# Patient Record
Sex: FEMALE | Race: WHITE | NOT HISPANIC OR LATINO | URBAN - METROPOLITAN AREA
[De-identification: names, ages, dates, MRNs, and addresses within clinical notes are randomized per-mention and may not be internally consistent; named-entity substitution may affect disease eponyms.]

---

## 2024-02-24 ENCOUNTER — EMERGENCY (EMERGENCY)
Facility: HOSPITAL | Age: 74
LOS: 1 days | Discharge: HOME | End: 2024-02-24
Admitting: EMERGENCY MEDICINE
Payer: MEDICARE

## 2024-02-24 VITALS
HEIGHT: 66 IN | DIASTOLIC BLOOD PRESSURE: 92 MMHG | RESPIRATION RATE: 18 BRPM | OXYGEN SATURATION: 94 % | SYSTOLIC BLOOD PRESSURE: 170 MMHG | WEIGHT: 190.04 LBS | TEMPERATURE: 99 F | HEART RATE: 85 BPM

## 2024-02-24 DIAGNOSIS — I10 ESSENTIAL (PRIMARY) HYPERTENSION: ICD-10-CM
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
## 2024-02-24 DIAGNOSIS — S52.502A UNSPECIFIED FRACTURE OF THE LOWER END OF LEFT RADIUS, INITIAL ENCOUNTER FOR CLOSED FRACTURE: ICD-10-CM

## 2024-02-24 DIAGNOSIS — S52.612A DISPLACED FRACTURE OF LEFT ULNA STYLOID PROCESS, INITIAL ENCOUNTER FOR CLOSED FRACTURE: ICD-10-CM
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
## 2024-02-24 DIAGNOSIS — Z88.0 ALLERGY STATUS TO PENICILLIN: ICD-10-CM

## 2024-02-24 PROCEDURE — 73110 X-RAY EXAM OF WRIST: CPT

## 2024-02-24 PROCEDURE — 96374 THER/PROPH/DIAG INJ IV PUSH: CPT | Mod: XU

## 2024-02-24 PROCEDURE — 99284 EMERGENCY DEPT VISIT MOD MDM: CPT

## 2024-02-24 PROCEDURE — 73110 X-RAY EXAM OF WRIST: CPT | Mod: 26,LT

## 2024-02-24 PROCEDURE — 99285 EMERGENCY DEPT VISIT HI MDM: CPT | Mod: 25

## 2024-02-24 PROCEDURE — 25605 CLTX DST RDL FX/EPHYS SEP W/: CPT | Mod: LT

## 2024-02-24 RX ORDER — HYDROMORPHONE HYDROCHLORIDE 2 MG/ML
0.5 INJECTION INTRAMUSCULAR; INTRAVENOUS; SUBCUTANEOUS ONCE
Refills: 0 | Status: DISCONTINUED | OUTPATIENT
Start: 2024-02-24 | End: 2024-02-24

## 2024-02-24 RX ORDER — OXYCODONE HYDROCHLORIDE 5 MG/1
1 TABLET ORAL
Qty: 12 | Refills: 0
Start: 2024-02-24 | End: 2024-02-25

## 2024-02-24 RX ADMIN — HYDROMORPHONE HYDROCHLORIDE 0.5 MILLIGRAM(S): 2 INJECTION INTRAMUSCULAR; INTRAVENOUS; SUBCUTANEOUS at 13:53

## 2024-02-24 RX ADMIN — HYDROMORPHONE HYDROCHLORIDE 0.5 MILLIGRAM(S): 2 INJECTION INTRAMUSCULAR; INTRAVENOUS; SUBCUTANEOUS at 14:11

## 2024-02-24 NOTE — ED PROVIDER NOTE - PATIENT PORTAL LINK FT
Medication refill for     hydroCHLOROthiazide (HYDRODIURIL) 25 MG tablet    [facility] - [address] - P [phone] - F [phone]  
You can access the FollowMyHealth Patient Portal offered by [facility] by registering at the following website: [email or web address]. By joining [employer]’s FollowMyHealth portal, you will also be able to view your health information using other applications (apps) compatible with our system.

## 2024-02-24 NOTE — ED PROVIDER NOTE - CARE PROVIDER_API CALL
Xavier Caldwell  Orthopaedic Surgery  [address]  [place] [address]  Phone: [phone]  Fax: [phone]  Follow Up Time:

## 2024-02-24 NOTE — ED PROVIDER NOTE - PHYSICAL EXAMINATION
CONSTITUTIONAL: Well-appearing;  in no apparent distress.   HEAD: Normocephalic; atraumatic.   EYES: PERRL; EOM intact; conjunctiva and sclera erica  NECK: Supple; non-tender;   CARDIOVASCULAR: rrr,  RESPIRATORY: Breathing easily;   MSK: L wrist in splint, fingers moving freely, +swelling, good cap refill  EXT: No cyanosis or edema; N/V intact  SKIN: Normal for age and race; warm; dry; good turgor; no apparent lesions or rash.

## 2024-02-24 NOTE — ED PROVIDER NOTE - OBJECTIVE STATEMENT
73-year-old female with past medical history of hypertension, right-hand-dominant complaining of a left wrist fracture sustained yesterday.  Patient was walking her dog, her dog pulled her and she fell.  No head strike.  Seen in urgent care last night and was told she had a distal radius and ulnar fracture.  Arm was splinted and she was advised to come to the ER for reduction.  Pain controlled with Tylenol.  Denies numbness, tingling.

## 2024-02-24 NOTE — ED ADULT NURSE NOTE - OBJECTIVE STATEMENT
Patient with the fx to  left wrist. Sent by urgent care for further evaluation. Patient c/o of mild pain, and she took Tylenol PTA.

## 2024-02-24 NOTE — ED PROVIDER NOTE - CLINICAL SUMMARY MEDICAL DECISION MAKING FREE TEXT BOX
73-year-old female with past medical history of hypertension, right-hand-dominant complaining of a left wrist fracture sustained yesterday.  Patient was walking her dog, her dog pulled her and she fell.  No head strike.  Seen in urgent care last night and was told she had a distal radius and ulnar fracture.  Arm was splinted and she was advised to come to the ER for reduction.  Pain controlled with Tylenol.  Denies numbness, tingling. L wrist in splint, fingers moving freely, +swelling, good cap refill.   X-rays from urgent care uploaded show impacted, comminuted distal radius fracture and ulnar styloid fracture.  Ortho consulted.

## 2024-02-24 NOTE — ED ADULT NURSE NOTE - NSFALLRISKINTERV_ED_ALL_ED

## 2024-02-24 NOTE — ED ADULT TRIAGE NOTE - ACCOMPANIED BY
1. \"Have you been to the ER, urgent care clinic since your last visit? Hospitalized since your last visit? \" No    2. \"Have you seen or consulted any other health care providers outside of the [address] [place] [address] since your last visit? \" No     3. For patients aged 43-73: Has the patient had a colonoscopy / FIT/ Cologuard? NA - based on age      If the patient is female:    4. For patients aged 43-66: Has the patient had a mammogram within the past 2 years? NA - based on age or sex      11. For patients aged 21-65: Has the patient had a pap smear?  Yes - no Care Gap present tablet 3    omeprazole (PRILOSEC) 40 MG delayed release capsule Take 1 capsule by mouth 2 times daily 60 capsule 3    PARoxetine (PAXIL) 20 MG tablet Take 1 tablet by mouth daily 90 tablet 3    Levonorgest-Eth Estrad 91-Day 0.15-0.03 &0.01 MG TABS Take by mouth      DAYSEE 0.15-0.03 &0.01 MG TABS       albuterol sulfate HFA (PROVENTIL;VENTOLIN;PROAIR) 108 (90 Base) MCG/ACT inhaler INHALE 2 PUFFS BY MOUTH EVERY 4 HOURS AS NEEDED FOR COUGH AND CONGESTION      ondansetron (ZOFRAN-ODT) 4 MG disintegrating tablet Place 1 tablet under the tongue every 8 hours as needed for Nausea or Vomiting 10 tablet 1     No current facility-administered medications on file prior to visit. family history includes Diabetes in her maternal grandfather and maternal grandmother; Emphysema in her maternal grandmother; Hypertension in her maternal grandfather and maternal grandmother; No Known Problems in her father; Osteoarthritis in her paternal grandmother; Other in her mother and paternal grandmother.     Social History     Socioeconomic History    Marital status: Single     Spouse name: Not on file    Number of children: Not on file    Years of education: Not on file    Highest education level: Not on file   Occupational History    Not on file   Tobacco Use    Smoking status: Never     Passive exposure: Never    Smokeless tobacco: Never   Vaping Use    Vaping Use: Never used   Substance and Sexual Activity    Alcohol use: Yes    Drug use: No    Sexual activity: Not on file   Other Topics Concern    Not on file   Social History Narrative    Not on file     Social Determinants of Health     Financial Resource Strain: Low Risk  (10/23/2023)    Overall Financial Resource Strain (CARDIA)     Difficulty of Paying Living Expenses: Not hard at all   Food Insecurity: No Food Insecurity (10/23/2023)    Hunger Vital Sign     Worried About Running Out of Food in the Last Year: Never true     Ran Out of Food in the Last Year: Never true Immediate family member

## 2024-02-24 NOTE — ED ADULT TRIAGE NOTE - CHIEF COMPLAINT QUOTE
pt sent to ED by  s/p mechanical fall. pt stated " she fracture her left wrist in 2 different places, had splint by "

## 2024-02-24 NOTE — ED PROVIDER NOTE - NSFOLLOWUPINSTRUCTIONS_ED_ALL_ED_FT
Follow up with the Dr. Sebastian within 1 week  Take tylenol as needed for pain, for severe pain can take oxycodone     Wrist Fracture Treated With Immobilization    A wrist fracture is a break or crack in one of the bones of the wrist. The wrist is made up of eight small bones at the palm of the hand (carpal bones) and two long bones that make up the forearm (radius and ulna).    If the joint is stable and the bones are still in their normal position (nondisplaced), the injury may be treated with immobilization. This involves the use of a cast, splint, or sling to hold the wrist in place. Immobilization ensures that the bones continue to stay in the correct position while the wrist is healing.    What are the causes?  This condition may be caused by:  A direct force to the wrist.  Trauma, such as a car crash or falling on an outstretched hand.  What increases the risk?  The following factors may make you more likely to develop this condition:  Doing contact sports or high-risk sports such as skiing, biking, and ice skating.  Taking steroid medicines.  Smoking.  Being female.  Being .  Drinking more than three alcoholic beverages a day.  Having a condition that weakens the bones (osteoporosis).  Being older.  Having a history of previous fractures.  What are the signs or symptoms?  Symptoms of this condition include:  Pain.  Swelling.  Bruising.  Not being able to move the wrist normally.  The wrist may also hang in an odd position or appear deformed.    How is this diagnosed?  This condition may be diagnosed based on a physical exam and X-rays. You may also have a CT scan or MRI.    How is this treated?  Treatment for this condition involves wearing a cast, splint, or sling until the injured area is stable enough for you to begin range-of-motion exercises. You may also be prescribed pain medicine.    Follow these instructions at home:  If you have a cast:    Do not stick anything inside the cast to scratch your skin. Doing that increases your risk of infection.  Check the skin around the cast every day. Tell your health care provider about any concerns.  You may put lotion on dry skin around the edges of the cast. Do not put lotion on the skin underneath the cast.  Keep the cast clean and dry.  If you have a splint or sling:    Wear the splint or sling as told by your health care provider. Remove it only as told by your health care provider.  Loosen the splint or sling if your fingers tingle, become numb, or turn cold and blue.  Keep the splint or sling clean and dry.  Bathing    Do not take baths, swim, or use a hot tub until your health care provider approves. Ask your health care provider if you may take showers. You may only be allowed to take sponge baths.  If your cast, splint, or sling is not waterproof:  Do not let it get wet.  Cover it with a watertight covering when you take a bath or a shower.  If you have a sling, remove it for bathing only if your health care provider tells you it is safe to do that.  Managing pain, stiffness, and swelling      If directed, put ice on the injured area. To do this:  If you have a removable splint or sling, remove it as told by your health care provider.  Put ice in a plastic bag.  Place a towel between your skin and the bag or between your cast and the bag.  Leave the ice on for 20 minutes, 2–3 times a day.  Remove the ice if your skin turns bright red. This is very important. If you cannot feel pain, heat, or cold, you have a greater risk of damage to the area.  Move your fingers often to reduce stiffness and swelling.  Raise (elevate) the injured area above the level of your heart while you are sitting or lying down.  Activity    Return to your normal activities as told by your health care provider. Ask your health care provider what activities are safe for you.  Do not lift with or put weight on your injured wrist until your health care provider approves.  Ask your health care provider when it is safe to drive if you have a cast, splint, or sling on your wrist.  Do range-of-motion exercises only as told by your health care provider or physical therapist.  Medicines    Take over-the-counter and prescription medicines only as told by your health care provider.  Ask your health care provider if the medicine prescribed to you:  Requires you to avoid driving or using machinery.  Can cause constipation. You may need to take these actions to prevent or treat constipation:  Drink enough fluid to keep your urine pale yellow.  Take over-the-counter or prescription medicines.  Eat foods that are high in fiber, such as beans, whole grains, and fresh fruits and vegetables.  Limit foods that are high in fat and processed sugars, such as fried or sweet foods.  General instructions    Do not put pressure on any part of the cast or splint until it is fully hardened. This may take several hours.  Do not use any products that contain nicotine or tobacco, such as cigarettes, e-cigarettes, and chewing tobacco. These can delay bone healing. If you need help quitting, ask your health care provider.  Keep all follow-up visits. This is important.  Contact a health care provider if:  Your cast, splint, or sling is damaged or loose.  You have any new pain, swelling, or bruising.  Your pain, swelling, and bruising do not improve.  You have a fever.  You have chills.  Get help right away if:  Your skin or fingers on your injured arm turn blue or gray.  Your arm feels cold or numb.  You have severe pain in your injured wrist.  Summary  A wrist fracture is a break or crack in one of the bones of the wrist.  If the joint is stable and the bones are still in their normal position, the injury may be treated by wearing a cast, splint, or sling.  If you have a cast, check the skin around the cast every day. Tell your health care provider about any concerns.  If you have a splint or sling, wear it as told by your health care provider. Remove it only as told by your health care provider.  This information is not intended to replace advice given to you by your health care provider. Make sure you discuss any questions you have with your health care provider.

## 2024-02-24 NOTE — CONSULT NOTE ADULT - SUBJECTIVE AND OBJECTIVE BOX
Orthopaedic Surgery Consult Note    For Surgeon: Romulo      73y old  Female who presents with a chief complaint of  wrist pain after a ground level fall.  Pt denies any numbness/tingling, head trauma/LOC. Endorses moderate pain to her wrist. Able to wiggle her fingers.    Allergies    Augmentin (Hives; Rash)    Intolerances      PAST MEDICAL & SURGICAL HISTORY:  No pertinent past medical history        MEDICATIONS  (STANDING):    MEDICATIONS  (PRN):  HYDROmorphone  Injectable 0.5 milliGRAM(s) IV Push Once PRN pain      Vital Signs Last 24 Hrs  T(C): 37.1 (24 Feb 2024 11:26), Max: 37.1 (24 Feb 2024 11:26)  T(F): 98.7 (24 Feb 2024 11:26), Max: 98.7 (24 Feb 2024 11:26)  HR: 85 (24 Feb 2024 11:26) (85 - 85)  BP: 170/92 (24 Feb 2024 11:26) (170/92 - 170/92)  BP(mean): --  RR: 18 (24 Feb 2024 11:26) (18 - 18)  SpO2: 94% (24 Feb 2024 11:26) (94% - 94%)    Parameters below as of 24 Feb 2024 11:26  Patient On (Oxygen Delivery Method): room air        Physical Exam:  Left  upper extremity  Skin intact, +Swelling, +Ecchymosis  Decreased rom wrist 2/2 pain  Pulses intact, brisk cap refill  Sensation intact M/R/U  Motor intact AIN/PIN/U  WF/WE ROM  Supination/Pronation ROM  Elbow NT full active rom w/o pain    Imaging:   Xray Left  shows +distal radius fracture    Procedure:  Ring removal and Hematoma block  Using aseptic technique injected 10cc 1% lidocaine w/o epi into Left wrist performing hematoma block. Reduction performed, sugartong splint applied. Patient tolerated procedure well, neurovasc intact afterwards.    A/P: 73yFemale with Left distal radius fx  - fracture reduction performed  -NWB LUE, sling when ambulating, elevate LUE when lying down or sitting   -Pain control  -ice/elevation  -Post splint xray reviewed, fracture well aligned   -f/u with Dr. Sebastian in 3-5 days to discuss definitive treatment   -Discussed strict ED return precautions including tingling/numbness, severe pain, other concerns. Pt understood   -Case including imaging and plan reviewed and discussed with Dr. Romulo Amor, PGY1  Orthopedic Surgery  Ortho Pager [phone]

## 2024-03-14 ENCOUNTER — NON-APPOINTMENT (OUTPATIENT)
Age: 74
End: 2024-03-14

## 2024-03-14 PROBLEM — Z00.00 ENCOUNTER FOR PREVENTIVE HEALTH EXAMINATION: Status: ACTIVE | Noted: 2024-03-14

## 2024-08-22 ENCOUNTER — NON-APPOINTMENT (OUTPATIENT)
Age: 74
End: 2024-08-22

## 2024-11-15 ENCOUNTER — EMERGENCY (EMERGENCY)
Facility: HOSPITAL | Age: 74
LOS: 1 days | Discharge: HOME | End: 2024-11-15
Attending: EMERGENCY MEDICINE | Admitting: EMERGENCY MEDICINE
Payer: MEDICARE

## 2024-11-15 VITALS
SYSTOLIC BLOOD PRESSURE: 196 MMHG | HEART RATE: 62 BPM | RESPIRATION RATE: 16 BRPM | DIASTOLIC BLOOD PRESSURE: 81 MMHG | OXYGEN SATURATION: 97 % | HEIGHT: 66 IN | WEIGHT: 184.97 LBS | TEMPERATURE: 98 F

## 2024-11-15 VITALS
RESPIRATION RATE: 16 BRPM | OXYGEN SATURATION: 98 % | SYSTOLIC BLOOD PRESSURE: 161 MMHG | TEMPERATURE: 98 F | HEART RATE: 64 BPM | DIASTOLIC BLOOD PRESSURE: 90 MMHG

## 2024-11-15 PROCEDURE — 99284 EMERGENCY DEPT VISIT MOD MDM: CPT | Mod: 25

## 2024-11-15 PROCEDURE — 90471 IMMUNIZATION ADMIN: CPT

## 2024-11-15 PROCEDURE — 73140 X-RAY EXAM OF FINGER(S): CPT | Mod: 26,RT

## 2024-11-15 PROCEDURE — 90715 TDAP VACCINE 7 YRS/> IM: CPT

## 2024-11-15 PROCEDURE — 73140 X-RAY EXAM OF FINGER(S): CPT

## 2024-11-15 PROCEDURE — 12001 RPR S/N/AX/GEN/TRNK 2.5CM/<: CPT

## 2024-11-15 PROCEDURE — 99283 EMERGENCY DEPT VISIT LOW MDM: CPT | Mod: 25

## 2024-11-15 RX ORDER — CLINDAMYCIN PHOSPHATE 150 MG/ML
300 VIAL (ML) INJECTION ONCE
Refills: 0 | Status: COMPLETED | OUTPATIENT
Start: 2024-11-15 | End: 2024-11-15

## 2024-11-15 RX ORDER — CLINDAMYCIN PHOSPHATE 150 MG/ML
1 VIAL (ML) INJECTION
Qty: 21 | Refills: 0
Start: 2024-11-15 | End: 2024-11-21

## 2024-11-15 RX ORDER — CLOSTRIDIUM TETANI TOXOID ANTIGEN (FORMALDEHYDE INACTIVATED), CORYNEBACTERIUM DIPHTHERIAE TOXOID ANTIGEN (FORMALDEHYDE INACTIVATED), BORDETELLA PERTUSSIS TOXOID ANTIGEN (GLUTARALDEHYDE INACTIVATED), BORDETELLA PERTUSSIS FILAMENTOUS HEMAGGLUTININ ANTIGEN (FORMALDEHYDE INACTIVATED), BORDETELLA PERTUSSIS PERTACTIN ANTIGEN, AND BORDETELLA PERTUSSIS FIMBRIAE 2/3 ANTIGEN 5; 2; 2.5; 5; 3; 5 [LF]/.5ML; [LF]/.5ML; UG/.5ML; UG/.5ML; UG/.5ML; UG/.5ML
0.5 INJECTION, SUSPENSION INTRAMUSCULAR ONCE
Refills: 0 | Status: COMPLETED | OUTPATIENT
Start: 2024-11-15 | End: 2024-11-15

## 2024-11-15 RX ORDER — SULFAMETHOXAZOLE/TRIMETHOPRIM 800-160 MG
1 TABLET ORAL
Qty: 14 | Refills: 0
Start: 2024-11-15 | End: 2024-11-21

## 2024-11-15 RX ORDER — SULFAMETHOXAZOLE/TRIMETHOPRIM 800-160 MG
1 TABLET ORAL ONCE
Refills: 0 | Status: COMPLETED | OUTPATIENT
Start: 2024-11-15 | End: 2024-11-15

## 2024-11-15 RX ADMIN — Medication 300 MILLIGRAM(S): at 02:18

## 2024-11-15 RX ADMIN — Medication 1 TABLET(S): at 02:18

## 2024-11-15 RX ADMIN — CLOSTRIDIUM TETANI TOXOID ANTIGEN (FORMALDEHYDE INACTIVATED), CORYNEBACTERIUM DIPHTHERIAE TOXOID ANTIGEN (FORMALDEHYDE INACTIVATED), BORDETELLA PERTUSSIS TOXOID ANTIGEN (GLUTARALDEHYDE INACTIVATED), BORDETELLA PERTUSSIS FILAMENTOUS HEMAGGLUTININ ANTIGEN (FORMALDEHYDE INACTIVATED), BORDETELLA PERTUSSIS PERTACTIN ANTIGEN, AND BORDETELLA PERTUSSIS FIMBRIAE 2/3 ANTIGEN 0.5 MILLILITER(S): 5; 2; 2.5; 5; 3; 5 INJECTION, SUSPENSION INTRAMUSCULAR at 02:18

## 2024-11-15 NOTE — ED ADULT TRIAGE NOTE - CHIEF COMPLAINT QUOTE
Patient presents to the ED with dog bite from family dog, patient reports that dog is utd with vaccines. Patient unknown tdap status.

## 2024-11-15 NOTE — ED ADULT NURSE NOTE - OBJECTIVE STATEMENT
c/o dog bite to left 4th digit, pt states her dog dug into a trash & found a bone.   Pt states she tried to pull the bone away from the dog, but the dog bit her. c/o dog bite to right 3rd digit, pt states her dog dug into a trash & found a bone.   Pt states she tried to pull the bone away from the dog, but the dog bit her.

## 2024-11-15 NOTE — ED PROVIDER NOTE - OBJECTIVE STATEMENT
74F hx htn, c/o dog bite. pt states she was walking her dog and tried to get a bone out of her mouth. states she bit her right middle finger. pt right hand dominant. unk last tetanus. states dog vaccinated.

## 2024-11-15 NOTE — ED PROVIDER NOTE - CLINICAL SUMMARY MEDICAL DECISION MAKING FREE TEXT BOX
s/p dog bite, laceration to right middle finger  -check xray  -clinda, bactrim  -tetanus  -repair lac s/p dog bite, laceration to right middle finger. neurovasc intact, no evidence of tendon laceration at this time  -check xray  -clinda, bactrim  -tetanus  -repair lac

## 2024-11-15 NOTE — ED PROVIDER NOTE - PATIENT PORTAL LINK FT
You can access the FollowMyHealth Patient Portal offered by [facility] by registering at the following website: [email or web address]. By joining [employer]’s FollowMyHealth portal, you will also be able to view your health information using other applications (apps) compatible with our system.

## 2024-11-15 NOTE — ED PROVIDER NOTE - PROGRESS NOTE DETAILS
laceration sutured, dressed with bacitracin and gauze. recommend removal in 1 week  I have discussed the discharge plan with the patient. The patient agrees with the plan, as discussed.  The patient understands Emergency Department diagnosis is a preliminary diagnosis often based on limited information and that the patient must adhere to the follow-up plan as discussed.  The patient understands that if the symptoms worsen or if prescribed medications do not have the desired/planned effect that the patient may return to the Emergency Department at any time for further evaluation and treatment.

## 2024-11-18 DIAGNOSIS — I10 ESSENTIAL (PRIMARY) HYPERTENSION: ICD-10-CM

## 2024-11-18 DIAGNOSIS — S61.212A LACERATION WITHOUT FOREIGN BODY OF RIGHT MIDDLE FINGER WITHOUT DAMAGE TO NAIL, INITIAL ENCOUNTER: ICD-10-CM

## 2024-11-18 DIAGNOSIS — Z23 ENCOUNTER FOR IMMUNIZATION: ICD-10-CM
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
## 2024-11-18 DIAGNOSIS — Z88.0 ALLERGY STATUS TO PENICILLIN: ICD-10-CM
